# Patient Record
Sex: MALE | Race: WHITE | NOT HISPANIC OR LATINO | Employment: OTHER | URBAN - METROPOLITAN AREA
[De-identification: names, ages, dates, MRNs, and addresses within clinical notes are randomized per-mention and may not be internally consistent; named-entity substitution may affect disease eponyms.]

---

## 2023-09-01 PROBLEM — Q17.9 CONGENITAL ABNORMALITY OF EXTERNAL EAR: Status: ACTIVE | Noted: 2023-09-01

## 2023-09-01 PROBLEM — H61.21 IMPACTED CERUMEN OF RIGHT EAR: Status: ACTIVE | Noted: 2023-09-01

## 2023-09-01 PROBLEM — J33.9 LEFT NASAL POLYPS: Status: ACTIVE | Noted: 2023-09-01

## 2023-09-01 PROBLEM — H90.A31 MIXED CONDUCTIVE AND SENSORINEURAL HEARING LOSS, UNILATERAL, RIGHT EAR WITH RESTRICTED HEARING ON THE CONTRALATERAL SIDE: Status: ACTIVE | Noted: 2023-09-01

## 2023-09-01 PROBLEM — H90.A22 SENSORINEURAL HEARING LOSS (SNHL) OF LEFT EAR WITH RESTRICTED HEARING OF RIGHT EAR: Status: ACTIVE | Noted: 2023-09-01

## 2023-09-01 RX ORDER — IBUPROFEN 800 MG/1
TABLET ORAL
COMMUNITY

## 2023-09-01 RX ORDER — CHLORHEXIDINE GLUCONATE ORAL RINSE 1.2 MG/ML
SOLUTION DENTAL
COMMUNITY

## 2023-09-01 RX ORDER — PRAVASTATIN SODIUM 40 MG/1
TABLET ORAL
COMMUNITY
Start: 2013-04-11

## 2023-09-01 RX ORDER — MUPIROCIN 20 MG/G
OINTMENT TOPICAL
COMMUNITY
Start: 2015-05-28

## 2023-10-09 ENCOUNTER — OFFICE VISIT (OUTPATIENT)
Dept: OTOLARYNGOLOGY | Facility: CLINIC | Age: 78
End: 2023-10-09
Payer: MEDICARE

## 2023-10-09 VITALS — HEIGHT: 70 IN | BODY MASS INDEX: 23.55 KG/M2 | TEMPERATURE: 97.4 F | WEIGHT: 164.5 LBS

## 2023-10-09 DIAGNOSIS — H90.A31 MIXED CONDUCTIVE AND SENSORINEURAL HEARING LOSS, UNILATERAL, RIGHT EAR WITH RESTRICTED HEARING ON THE CONTRALATERAL SIDE: ICD-10-CM

## 2023-10-09 DIAGNOSIS — Q17.9 CONGENITAL ABNORMALITY OF EXTERNAL EAR: Primary | ICD-10-CM

## 2023-10-09 DIAGNOSIS — H90.A22 SENSORINEURAL HEARING LOSS (SNHL) OF LEFT EAR WITH RESTRICTED HEARING OF RIGHT EAR: ICD-10-CM

## 2023-10-09 DIAGNOSIS — H95.191 ENCOUNTER FOR DEBRIDEMENT OF RIGHT POSTMASTOIDECTOMY CAVITY: ICD-10-CM

## 2023-10-09 PROBLEM — H61.21 IMPACTED CERUMEN OF RIGHT EAR: Status: RESOLVED | Noted: 2023-09-01 | Resolved: 2023-10-09

## 2023-10-09 PROCEDURE — 69220 CLEAN OUT MASTOID CAVITY: CPT | Performed by: OTOLARYNGOLOGY

## 2023-10-09 PROCEDURE — 1036F TOBACCO NON-USER: CPT | Performed by: OTOLARYNGOLOGY

## 2023-10-09 PROCEDURE — 1160F RVW MEDS BY RX/DR IN RCRD: CPT | Performed by: OTOLARYNGOLOGY

## 2023-10-09 PROCEDURE — 99213 OFFICE O/P EST LOW 20 MIN: CPT | Performed by: OTOLARYNGOLOGY

## 2023-10-09 PROCEDURE — 1159F MED LIST DOCD IN RCRD: CPT | Performed by: OTOLARYNGOLOGY

## 2023-10-09 ASSESSMENT — PATIENT HEALTH QUESTIONNAIRE - PHQ9
1. LITTLE INTEREST OR PLEASURE IN DOING THINGS: NOT AT ALL
2. FEELING DOWN, DEPRESSED OR HOPELESS: NOT AT ALL
SUM OF ALL RESPONSES TO PHQ9 QUESTIONS 1 AND 2: 0

## 2023-10-09 NOTE — PROGRESS NOTES
History Of Present Illness:  Fei Cody is a 77 y.o. 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 He has not been interested in getting amplification.     In the past, we have discussed [employer]ross hearing aids and OSIA and he was not interested. He has not had any problems since the last time I saw him.      Past Medical History:  He has a past medical history of Conductive hearing loss, unilateral, right ear with restricted hearing on the contralateral side (03/07/2022), Personal history of other endocrine, nutritional and metabolic disease, Personal history of other malignant neoplasm of skin, and Snoring.    Surgical History:  He has a past surgical history that includes Appendectomy (10/10/2013); Tonsillectomy (10/10/2013); Other surgical history (10/10/2013); and Hand surgery (10/10/2013).     Social History:  He has no history on file for tobacco use, alcohol use, and drug use.    Family History:  family history is not on file.     Medications:  Current Outpatient Medications   Medication Instructions    chlorhexidine (Peridex) 0.12 % solution No dose, route, or frequency recorded.    ibuprofen 800 mg tablet No dose, route, or frequency recorded.    mupirocin (Bactroban) 2 % ointment Mupirocin 2 % External Ointment   Quantity: 22  Refills: 0        Start : 28-May-2015   Active    pravastatin (Pravachol) 40 mg tablet Pravastatin Sodium 40 MG Oral Tablet   Quantity: 90  Refills: 0        Start : 11-Apr-2013   Active        Allergies:  Bee pollen    Review of Systems:   A comprehensive 10-point review of systems was obtained including constitutional, neurological, HEENT, pulmonary, cardiovascular, genito-urinary, and other pertinent systems and was negative except as noted in the HPI.        Physical Exam:  Last Recorded  Vitals: There were no vitals taken for this visit.    On physical exam, the patient is a well-nourished, well-developed patient, in no acute distress, able to communicate without assistance in English language. Head and face is atraumatic and normocephalic. Salivary glands are intact. Facial strength is symmetrical bilaterally.       On ear examination:  Right ear: The patient has large canalplasty and meatoplasty and a cerumen impaction that I removed. The neotympanum has airs of tympanosclerosis.    BC>AC  Left ear: The patient has an open and patent ear canal. The tympanic membrane is normal.   AC>BC  The Zaman is right    On vestibular exam, the patient has no spontaneous nystagmus, no headshake nystagmus, no head-thrust nystagmus, and no nystagmus on hyperventilation or Valsalva maneuvers. Johanna-Hallpike maneuver is negative bilaterally.       On neuro exam, the patient is alert and oriented x3, cranial nerves are grossly intact, cerebellar exam is normal.      The rest of the exam, including anterior rhinoscopy, oropharyngeal exam, neck exam, and cardiovascular exam, were normal including no palpable lymphadenopathies, thyroid in the midline position, normal pulses, and normal chest excursion.       Procedure:  None    Reviewed Results    I reviewed the patient's audiogram from 03/2022, which shows normal hearing on the left side downsloping to moderate sensorineural hearing loss in the high frequencies. On the right side, he has a profound mixed hearing loss with more than 60 dB of air-bone gap. He has 0% discrimination on the right and 100% discrimination on the left.     I reviewed and interpreted the patient's audiogram from 1997, which shows maximum conductive hearing loss on the right side and normal hearing on the left side with 100% discrimination bilaterally.       Assessment/Plan   In summary, Fei Cody is a 77 y.o. 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 The ear is dry, cerumen impaction was removed, and he remains with conductive hearing loss on the right side. The left ear has normal looking drum.      In the past, we discussed the role of amplification including OSIA and hearing aids, but he is not interested in this.      I plan to see him back in 6 months..

## 2024-04-15 ENCOUNTER — OFFICE VISIT (OUTPATIENT)
Dept: OTOLARYNGOLOGY | Facility: CLINIC | Age: 79
End: 2024-04-15
Payer: MEDICARE

## 2024-04-15 VITALS — WEIGHT: 165 LBS | HEIGHT: 70 IN | BODY MASS INDEX: 23.62 KG/M2

## 2024-04-15 ASSESSMENT — PATIENT HEALTH QUESTIONNAIRE - PHQ9
SUM OF ALL RESPONSES TO PHQ9 QUESTIONS 1 AND 2: 0
1. LITTLE INTEREST OR PLEASURE IN DOING THINGS: NOT AT ALL
2. FEELING DOWN, DEPRESSED OR HOPELESS: NOT AT ALL

## 2024-04-15 NOTE — PROGRESS NOTES
Reason for Consult:  Follow-up (Ear cleaning.)     Subjective   History Of Present Illness:  Fei Cody is a 78 y.o. 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 He has not been interested in getting amplification.      In the past, we have discussed [employer] hearing aids and OSIA and he was not interested. He has not had any problems since the last time I saw him.         Past Medical History:  He has a past medical history of Conductive hearing loss, unilateral, right ear with restricted hearing on the contralateral side (03/07/2022), Personal history of other endocrine, nutritional and metabolic disease, Personal history of other malignant neoplasm of skin, and Snoring.    Surgical History:  He has a past surgical history that includes Appendectomy (10/10/2013); Tonsillectomy (10/10/2013); Other surgical history (10/10/2013); and Hand surgery (10/10/2013).     Social History:  He reports that he has never smoked. He has never used smokeless tobacco. No history on file for alcohol use and drug use.    Family History:  family history is not on file.     Medications:  Current Outpatient Medications   Medication Instructions    chlorhexidine (Peridex) 0.12 % solution No dose, route, or frequency recorded.    ibuprofen 800 mg tablet No dose, route, or frequency recorded.    mupirocin (Bactroban) 2 % ointment Mupirocin 2 % External Ointment   Quantity: 22  Refills: 0        Start : 28-May-2015   Active    pravastatin (Pravachol) 40 mg tablet Pravastatin Sodium 40 MG Oral Tablet   Quantity: 90  Refills: 0        Start : 11-Apr-2013   Active    tamsulosin HCl (TAMSULOSIN ORAL) oral      Allergies:  Bee pollen, Finasteride, and Grass pollen    Review of Systems:   A comprehensive 10-point review of systems was obtained  "including constitutional, neurological, HEENT, pulmonary, cardiovascular, genito-urinary, and other pertinent systems and was negative except as noted in the HPI.      Objective   Physical Exam:  Last Recorded Vitals: Height 1.778 m (5' 10\"), weight 74.8 kg (165 lb).    On physical exam, the patient is a well-nourished, well-developed patient, in no acute distress, able to communicate without assistance in English language. Head and face is atraumatic and normocephalic. Salivary glands are intact. Facial strength is symmetrical bilaterally.       On ear examination:  Right ear: The patient has {Blank single:37179::\"stenotic ear canal\",\"cerumen impaction which was removed\",\"packing which was removed\",\"an open and patent ear canal\"}. The {Blank single:19197::\"neotympanum\",\"tympanic membrane\"} {Blank single:84654::\"has a perforation located in ***\",\"has an effusion\",\"is intact\"}.  {Blank single:19197::\"Cannot discern sound\",\"BC>AC\",\"AC>BC\"}  Left ear: The patient has {Blank single:15883::\"stenotic ear canal\",\"cerumen impaction which was removed\",\"packing which was removed\",\"an open and patent ear canal\"}. The {Blank single:19197::\"neotympanum\",\"tympanic membrane\"} {Blank single:98161::\"has a perforation located in ***\",\"has an effusion\",\"is intact\"}.  {Blank single:47439::\"Cannot discern sound\",\"BC>AC\",\"AC>BC\"}  The Zaman is {Blank single:59048::\"right\",\"left\",\"midline\"}    On vestibular exam, the patient has no spontaneous nystagmus, no headshake nystagmus, no head-thrust nystagmus, and no nystagmus on hyperventilation or Valsalva maneuvers. Orange-Hallpike maneuver is negative bilaterally.       On neuro exam, the patient is alert and oriented x3, cranial nerves are grossly intact, cerebellar exam is normal.      The rest of the exam, including anterior rhinoscopy, oropharyngeal exam, neck exam, and cardiovascular exam, were normal including no palpable lymphadenopathies, thyroid in the midline position, normal pulses, " "and normal chest excursion.       Reviewed Results:  Audiology Testing:  *** I personally reviewed the audiogram from *** that showed:   Right ear: *** . Discrimination: ***%   Left ear: *** . Discrimination: ***%    Imaging:  *** I personally reviewed the CT of the temporal bone from *** that showed:      *** I personally reviewed the MRI of the temporal bone from *** that showed:     Procedure:  {Blank single:07718::\"None\"}    Assessment/Plan     No diagnosis found.    In summary, Fei Cody is a 78 y.o. 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 The ear is dry, cerumen impaction was removed, and he remains with conductive hearing loss on the right side. The left ear has normal looking drum.      In the past, we discussed the role of amplification including OSIA and hearing aids, but he is not interested in this. .    *** I plan to see him back in 6 months..      ____________________________________________________  Martínez Harrison MD  Professor and Chief   Otology/Neurotology/Lateral Skull-Base Surgery   [facility]      Scribe Attestation  By signing my name below, I, Arianna Llanes, Scribe   attest that this documentation has been prepared under the direction and in the presence of Martínez Odell MD.   "

## 2025-04-16 NOTE — LETTER
October 9, 2023     LISET Mcbride MD  [address] [facility] [place] [address]    Patient: Fei Cody   YOB: 1945   Date of Visit: 10/9/2023       Dear Dr. LISET Mcbride MD:    Thank you for referring Fei Cody to me for evaluation. Below are my notes for this consultation.  If you have questions, please do not hesitate to call me. I look forward to following your patient along with you.       Sincerely,     Martínez Odell MD      CC: Russel Coyle MD  ______________________________________________________________________________________            History Of Present Illness:  Fei Cody is a 77 y.o. 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 He has not been interested in getting amplification.     In the past, we have discussed [employer] hearing aids and OSIA and he was not interested. He has not had any problems since the last time I saw him.      Past Medical History:  He has a past medical history of Conductive hearing loss, unilateral, right ear with restricted hearing on the contralateral side (03/07/2022), Personal history of other endocrine, nutritional and metabolic disease, Personal history of other malignant neoplasm of skin, and Snoring.    Surgical History:  He has a past surgical history that includes Appendectomy (10/10/2013); Tonsillectomy (10/10/2013); Other surgical history (10/10/2013); and Hand surgery (10/10/2013).     Social History:  He has no history on file for tobacco use, alcohol use, and drug use.    Family History:  family history is not on file.     Medications:  Current Outpatient Medications   Medication Instructions   • chlorhexidine (Peridex) 0.12 % solution No dose, route, or frequency recorded.   • ibuprofen 800 mg tablet No  [] : Fellow dose, route, or frequency recorded.   • mupirocin (Bactroban) 2 % ointment Mupirocin 2 % External Ointment   Quantity: 22  Refills: 0        Start : 28-May-2015   Active   • pravastatin (Pravachol) 40 mg tablet Pravastatin Sodium 40 MG Oral Tablet   Quantity: 90  Refills: 0        Start : 11-Apr-2013   Active        Allergies:  Bee pollen    Review of Systems:   A comprehensive 10-point review of systems was obtained including constitutional, neurological, HEENT, pulmonary, cardiovascular, genito-urinary, and other pertinent systems and was negative except as noted in the HPI.        Physical Exam:  Last Recorded Vitals: There were no vitals taken for this visit.    On physical exam, the patient is a well-nourished, well-developed patient, in no acute distress, able to communicate without assistance in English language. Head and face is atraumatic and normocephalic. Salivary glands are intact. Facial strength is symmetrical bilaterally.       On ear examination:  Right ear: The patient has large canalplasty and meatoplasty and a cerumen impaction that I removed. The neotympanum has airs of tympanosclerosis.    BC>AC  Left ear: The patient has an open and patent ear canal. The tympanic membrane is normal.   AC>BC  The Zaman is right    On vestibular exam, the patient has no spontaneous nystagmus, no headshake nystagmus, no head-thrust nystagmus, and no nystagmus on hyperventilation or Valsalva maneuvers. Vanduser-Hallpike maneuver is negative bilaterally.       On neuro exam, the patient is alert and oriented x3, cranial nerves are grossly intact, cerebellar exam is normal.      The rest of the exam, including anterior rhinoscopy, oropharyngeal exam, neck exam, and cardiovascular exam, were normal including no palpable lymphadenopathies, thyroid in the midline position, normal pulses, and normal chest excursion.       Procedure:  None    Reviewed Results    I reviewed the patient's audiogram from 03/2022, which shows  [FreeTextEntry3] : Poorly controlled DM2 complicated by severe diabetic neuropathy along with spinal disc disease. CGM data shows BG above goal fasting and mealtime- will further titrate up as recommended above. For chronic management, pt would benefit from beta bionic pancreas pump- team will reach out to rep to start discussion. Patient with otherwise normal testosterone level and extensive discussion stating that erectile dysfunction is not due to low testosterone and supplement is not indicated. Patient may return to pcp for further assessment of other etiology/workup for erectile dysfunction. normal hearing on the left side downsloping to moderate sensorineural hearing loss in the high frequencies. On the right side, he has a profound mixed hearing loss with more than 60 dB of air-bone gap. He has 0% discrimination on the right and 100% discrimination on the left.     I reviewed and interpreted the patient's audiogram from 1997, which shows maximum conductive hearing loss on the right side and normal hearing on the left side with 100% discrimination bilaterally.       Assessment/Plan  In summary, Fei Cody is a 77 y.o. 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 The ear is dry, cerumen impaction was removed, and he remains with conductive hearing loss on the right side. The left ear has normal looking drum.      In the past, we discussed the role of amplification including OSIA and hearing aids, but he is not interested in this.      I plan to see him back in 6 months..            [Time Spent: ___ minutes] : I have spent [unfilled] minutes of time on the encounter which excludes teaching and separately reported services.